# Patient Record
Sex: FEMALE | Race: OTHER | HISPANIC OR LATINO | Employment: UNEMPLOYED | URBAN - METROPOLITAN AREA
[De-identification: names, ages, dates, MRNs, and addresses within clinical notes are randomized per-mention and may not be internally consistent; named-entity substitution may affect disease eponyms.]

---
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
## 2023-08-10 ENCOUNTER — OFFICE VISIT (OUTPATIENT)
Dept: FAMILY MEDICINE CLINIC | Facility: CLINIC | Age: 23
End: 2023-08-10
Payer: MEDICARE

## 2023-08-10 VITALS
WEIGHT: 131 LBS | DIASTOLIC BLOOD PRESSURE: 64 MMHG | SYSTOLIC BLOOD PRESSURE: 118 MMHG | TEMPERATURE: 97.6 F | OXYGEN SATURATION: 99 % | HEART RATE: 78 BPM | BODY MASS INDEX: 24.11 KG/M2 | HEIGHT: 62 IN

## 2023-08-10 DIAGNOSIS — L30.9 DERMATITIS: ICD-10-CM

## 2023-08-10 DIAGNOSIS — Z00.00 WELL ADULT EXAM: Primary | ICD-10-CM

## 2023-08-10 PROCEDURE — 99385 PREV VISIT NEW AGE 18-39: CPT | Performed by: PHYSICIAN ASSISTANT

## 2023-08-10 RX ORDER — FLUCONAZOLE 150 MG/1
TABLET ORAL
COMMUNITY
Start: 2023-07-25 | End: 2023-08-10

## 2023-08-10 NOTE — PROGRESS NOTES
Name: Oneal Fatima      : 2000      MRN: [id number]  Encounter Provider: Sil Mars PA-C  Encounter Date: 8/10/2023   Encounter department: [address] [place] [address]     1. Well adult exam    2. Dermatitis    -I did recommend she utilize over-the-counter hydrocortisone cream twice daily for the rash on both of her breast.  Maximum use for 2 weeks  I then recommend that she use Eucerin cream twice daily thereafter. Follow-up with me or gynecology if there is no improvement with treatment  -She is scheduled for her screening Pap smear on August 15 with [facility] gynecology  - I did recommend routine physical in 1 year    M*Doochoo software was used to dictate this note. It may contain errors with dictating incorrect words/spelling. Please contact provider directly for any questions. Subjective      Patient presents today for annual physical/establishment here. She states that she has a rash around both of her breasts since  that has improved with oil. The rash is itchy. She did make an appointment with her gynecologist who recommended she have her annual and address it at that visit. She was wondering if I could just look at the her breast today. She states that she does have an appointment to see the dentist tomorrow for routine cleaning  She does wear glasses and her last exam was this year  She does eat a moderate healthy diet. She primarily drinks water throughout the day  She does walk at least 20 minutes/day.   At times she hikes  She does smoke marijuana daily  She does drink alcohol occasionally  Denies any vaping or tobacco use  She is scheduled for her Pap smear on 8/15/2023 at [facility]    Review of Systems    Current Outpatient Medications on File Prior to Visit   Medication Sig   • [DISCONTINUED] fluconazole (DIFLUCAN) 150 mg tablet Take one tablet now and repeat in three days if necessary (Patient not taking: Reported on 8/10/2023)       Objective     /64 (BP Location: Left arm, Patient Position: Sitting, Cuff Size: Standard)   Pulse 78   Temp 97.6 °F (36.4 °C) (Tympanic)   Ht 5' 2" (1.575 m)   Wt 59.4 kg (131 lb)   SpO2 99%   BMI 23.96 kg/m²     Physical Exam  Vitals reviewed. Constitutional:       General: She is not in acute distress. Appearance: Normal appearance. She is well-developed. She is not ill-appearing, toxic-appearing or diaphoretic. HENT:      Head: Normocephalic and atraumatic. Right Ear: Tympanic membrane normal. There is no impacted cerumen. Left Ear: Tympanic membrane normal. There is no impacted cerumen. Nose: Nose normal.      Mouth/Throat:      Mouth: Mucous membranes are moist.      Pharynx: No oropharyngeal exudate. Eyes:      Pupils: Pupils are equal, round, and reactive to light. Neck:      Thyroid: No thyromegaly. Cardiovascular:      Rate and Rhythm: Normal rate and regular rhythm. Heart sounds: Normal heart sounds. No murmur heard. No friction rub. No gallop. Pulmonary:      Effort: Pulmonary effort is normal. No respiratory distress. Breath sounds: Normal breath sounds. No wheezing, rhonchi or rales. Abdominal:      General: Bowel sounds are normal.      Palpations: Abdomen is soft. There is no mass. Tenderness: There is no abdominal tenderness. Musculoskeletal:         General: No deformity. Cervical back: Neck supple. Right lower leg: No edema. Left lower leg: No edema. Lymphadenopathy:      Cervical: No cervical adenopathy. Skin:     General: Skin is warm. Comments: Bilateral breast: She does have some mild hyperpigmentation around the areola. No breast discharge. Neurological:      General: No focal deficit present. Mental Status: She is alert. Psychiatric:         Mood and Affect: Mood normal.         Behavior: Behavior normal.         Thought Content:  Thought content normal. Judgment: Judgment normal.       Karen Hammer PA-C

## 2024-03-07 ENCOUNTER — TELEPHONE (OUTPATIENT)
Dept: FAMILY MEDICINE CLINIC | Facility: CLINIC | Age: 24
End: 2024-03-07

## 2024-03-07 NOTE — TELEPHONE ENCOUNTER
Please call her to reschedule her annual physical that is scheduled on 8/12/2024 since I will not be in the office on that day.  Thank you

## 2024-09-30 ENCOUNTER — OFFICE VISIT (OUTPATIENT)
Age: 24
End: 2024-09-30
Payer: MEDICARE

## 2024-09-30 VITALS
SYSTOLIC BLOOD PRESSURE: 110 MMHG | DIASTOLIC BLOOD PRESSURE: 60 MMHG | HEART RATE: 74 BPM | OXYGEN SATURATION: 97 % | BODY MASS INDEX: 25.95 KG/M2 | TEMPERATURE: 99.6 F | HEIGHT: 62 IN | RESPIRATION RATE: 16 BRPM | WEIGHT: 141 LBS

## 2024-09-30 DIAGNOSIS — Z13.1 DIABETES MELLITUS SCREENING: ICD-10-CM

## 2024-09-30 DIAGNOSIS — N93.8 DUB (DYSFUNCTIONAL UTERINE BLEEDING): ICD-10-CM

## 2024-09-30 DIAGNOSIS — Z00.00 WELL ADULT EXAM: Primary | ICD-10-CM

## 2024-09-30 DIAGNOSIS — Z13.220 LIPID SCREENING: ICD-10-CM

## 2024-09-30 PROCEDURE — 99395 PREV VISIT EST AGE 18-39: CPT | Performed by: PHYSICIAN ASSISTANT

## 2024-09-30 NOTE — PROGRESS NOTES
"Ambulatory Visit  Name: Jaime Duenas      : 2000      MRN: [id number]  Encounter Provider: Karen Hammer PA-C  Encounter Date: 2024   Encounter department: [facility] PRIMARY [facility]    Assessment & Plan  Well adult exam  -Pap smear is up-to-date and done 8/15/2023  - I did recommend she take vitamin D3 5000 IUs daily for health maintenance  - I did recommend the flu vaccine at the pharmacy in October or November  -She will proceed with screening blood work  - Routine physical in 1 year       Diabetes mellitus screening    Orders:    Comprehensive metabolic panel    Lipid screening    Orders:    Lipid panel    DUB (dysfunctional uterine bleeding)    Orders:    CBC and differential       History of Present Illness     Patient presents today for her annual physical.  She does not have any concerns at this time.    - She does see the dentist every 6 months and has an upcoming appointment on 10/7/2024  - She does wear glasses but she has not seen the eye doctor in at least 2 to 3 years.  She feels as though she does need her glasses updated.  She will call her insurance to see who is covered through an optometrist office  -She does eat a moderate healthy diet  - She does walk at least once a week.  She states that she used to go to the gym  - She does smoke marijuana every night to help herself calm down and sleep.  She denies any tobacco use, vaping or alcohol use  - Her last Pap smear was done with a gynecologist at [facility] on 8/15/2023          Review of Systems        Objective     /60 (BP Location: Right arm, Patient Position: Sitting, Cuff Size: Adult)   Pulse 74   Temp 99.6 °F (37.6 °C) (Tympanic)   Resp 16   Ht 5' 2\" (1.575 m)   Wt 64 kg (141 lb)   SpO2 97%   BMI 25.79 kg/m²     Physical Exam  Vitals reviewed.   Constitutional:       General: She is not in acute distress.     Appearance: Normal appearance. She is well-developed. She is not ill-appearing, " toxic-appearing or diaphoretic.   HENT:      Head: Normocephalic and atraumatic.   Neck:      Thyroid: No thyromegaly.   Cardiovascular:      Rate and Rhythm: Normal rate and regular rhythm.      Heart sounds: Normal heart sounds. No murmur heard.  Pulmonary:      Effort: Pulmonary effort is normal. No respiratory distress.      Breath sounds: Normal breath sounds. No wheezing, rhonchi or rales.   Abdominal:      General: Bowel sounds are normal.      Palpations: Abdomen is soft. There is no mass.      Tenderness: There is no abdominal tenderness.   Musculoskeletal:         General: No deformity.      Cervical back: Neck supple.      Right lower leg: No edema.      Left lower leg: No edema.   Lymphadenopathy:      Cervical: No cervical adenopathy.   Skin:     General: Skin is warm.   Neurological:      General: No focal deficit present.      Mental Status: She is alert.   Psychiatric:         Mood and Affect: Mood normal.         Behavior: Behavior normal.         Thought Content: Thought content normal.         Judgment: Judgment normal.